# Patient Record
Sex: FEMALE | Race: WHITE | NOT HISPANIC OR LATINO | Employment: UNEMPLOYED | URBAN - METROPOLITAN AREA
[De-identification: names, ages, dates, MRNs, and addresses within clinical notes are randomized per-mention and may not be internally consistent; named-entity substitution may affect disease eponyms.]

---

## 2020-05-06 ENCOUNTER — OFFICE VISIT (OUTPATIENT)
Dept: URGENT CARE | Facility: CLINIC | Age: 30
End: 2020-05-06

## 2020-05-06 VITALS
SYSTOLIC BLOOD PRESSURE: 126 MMHG | OXYGEN SATURATION: 94 % | DIASTOLIC BLOOD PRESSURE: 74 MMHG | RESPIRATION RATE: 16 BRPM | TEMPERATURE: 98.2 F | WEIGHT: 261 LBS | HEART RATE: 120 BPM

## 2020-05-06 DIAGNOSIS — J01.01 ACUTE RECURRENT MAXILLARY SINUSITIS: ICD-10-CM

## 2020-05-06 DIAGNOSIS — R22.0 RIGHT FACIAL SWELLING: ICD-10-CM

## 2020-05-06 PROCEDURE — 99214 OFFICE O/P EST MOD 30 MIN: CPT | Performed by: PHYSICIAN ASSISTANT

## 2020-05-06 RX ORDER — LEVOFLOXACIN 750 MG/1
750 TABLET, FILM COATED ORAL DAILY
Qty: 7 TAB | Refills: 0 | Status: SHIPPED | OUTPATIENT
Start: 2020-05-06 | End: 2020-05-13

## 2020-05-06 RX ORDER — PREDNISONE 10 MG/1
40 TABLET ORAL DAILY
Qty: 20 TAB | Refills: 0 | Status: SHIPPED | OUTPATIENT
Start: 2020-05-06 | End: 2020-05-11

## 2020-05-06 ASSESSMENT — ENCOUNTER SYMPTOMS
ABDOMINAL PAIN: 0
CONSTIPATION: 0
DIARRHEA: 0
SORE THROAT: 0
EYE PAIN: 0
COUGH: 1
SPUTUM PRODUCTION: 0
FEVER: 0
SINUS PAIN: 1
MYALGIAS: 0
HEADACHES: 0
NAUSEA: 0
VOMITING: 0
CHILLS: 0
SHORTNESS OF BREATH: 0

## 2020-05-06 NOTE — LETTER
May 9, 2020        Tonya Caputo      I just wanted to follow-up on our conversation and make sure that your directions were clear:    · If you are feeling better by Monday please call ENT and schedule an appointment at their direction.    · If you are feeling the exact same on Monday and are unable to get into ENT in a timely manner, consider getting the CT sinus and I will call with results.    · If you are feeling worse and it is severe, then go to the ER, if you are feeling marginally worse consider returning to urgent care for reevaluation.    Enclosed is an order for the CT of the sinuses that you can complete at [facility].        Sincerely,            Micah Montes P.A.-C.                           Micah Montes P.A.-C.

## 2020-05-06 NOTE — PROGRESS NOTES
Subjective:   Tonya Caputo is a 29 y.o. female who presents for Sinusitis (x 3 weeks)      This 29-year-old female presents with ongoing sinusitis for the last 3 weeks.  She has this problem every spring she reports and she has a humidifier, takes allergy medicines, avoids her allergens, and symptoms all developed a sinus infection.  She was seen at a separate urgent care and was given a prescription for doxycycline (note that she is amoxicillin allergic).  She reports absolutely 0 improvement in her symptoms with a course of doxycycline.  She was also switched to Xyzal and told to continue Sudafed.  She reports that since this visit last week she has been feeling worse with more right-sided facial pressure and now she has some right-sided facial swelling.  She has no problems chewing or with her dentition although she does have dentures and reports that periodically but not recently she does have some swelling of her gums that causes discomfort with her dentures.  She is not feeling this right now.  She denies any fever or chills, myalgias, chest pain, shortness of breath but has significant discomfort.  No photophobia, or visual changes.  No pain with eye movement.      Review of Systems   Constitutional: Negative for chills, fever and malaise/fatigue.   HENT: Positive for congestion, ear pain and sinus pain. Negative for sore throat.    Eyes: Negative for pain.   Respiratory: Positive for cough. Negative for sputum production and shortness of breath.    Cardiovascular: Negative for chest pain.   Gastrointestinal: Negative for abdominal pain, constipation, diarrhea, nausea and vomiting.   Genitourinary: Negative for dysuria.   Musculoskeletal: Negative for myalgias.   Skin: Positive for itching. Negative for rash.   Neurological: Negative for headaches.       Medications:    • metFORMIN Tabs  • SUDAFED CONGESTION PO    Allergies: Amoxicillin    Problem List: Tonya Caputo does not have a problem list on  file.    Surgical History:  No past surgical history on file.    Past Social Hx: Tonya Caputo  reports that she has quit smoking. She has never used smokeless tobacco. She reports current alcohol use. She reports that she does not use drugs.     Past Family Hx:  Tonya Caputo family history is not on file.     Problem list, medications, and allergies reviewed by myself today in Epic.     Objective:     /74 (BP Location: Left arm, Patient Position: Sitting, BP Cuff Size: Adult)   Pulse (!) 120   Temp 36.8 °C (98.2 °F) (Temporal)   Resp 16   Wt 118.4 kg (261 lb)   SpO2 94%     Physical Exam  Vitals signs reviewed.   Constitutional:       Appearance: Normal appearance.   HENT:      Head:      Comments: Right-sided facial scar.  Mild right-sided tenderness to palpation and swelling over the maxillary sinus.  No pain with EOMs.  No eyelid swelling.  No conjunctival injection     Right Ear: Tympanic membrane, ear canal and external ear normal.      Left Ear: Tympanic membrane, ear canal and external ear normal.      Nose: Nose normal.      Mouth/Throat:      Mouth: Mucous membranes are moist.   Eyes:      Conjunctiva/sclera: Conjunctivae normal.   Cardiovascular:      Rate and Rhythm: Normal rate.   Pulmonary:      Effort: Pulmonary effort is normal.   Skin:     General: Skin is warm and dry.      Capillary Refill: Capillary refill takes less than 2 seconds.   Neurological:      Mental Status: She is alert and oriented to person, place, and time.           Assessment/Plan:     Diagnosis and associated orders:     1. Acute recurrent maxillary sinusitis     2. Right facial swelling        Patient Instructions:       • Tylenol for pain, consider your current antiallergy regimen.  • Complete a full course of antibiotics.  • Take the full course of steroids  • If you are not significantly improved in 5 days I want you to return to clinic for further reevaluation and management and possible change of  antibiotic to clindamycin.  • If you have any pain with movements of the eye or your face continues to swell should return sooner         Comments/MDM:     This patient appears to have some facial swelling secondary to an acute right maxillary sinusitis.  The focality of her pain is in that area, and she describes the facial swelling as being coincident and parallel with her symptoms of sinusitis.  I put in referral for ENT.  The patient has such significant inflammation I think that the powerful anti-inflammatory properties of steroids will help calm it down and since she is allergic to amoxicillin as well as doxycycline levofloxacin is a reasonable choice.  I discussed the risks including psychiatric disturbances and tendinopathy's and we made a shared decision that this is an appropriate medication and the risks do not outweigh the benefits           Differential diagnosis, natural history, supportive care, and indications for immediate follow-up discussed.    Advised the patient to follow-up with the primary care physician for recheck, reevaluation, and consideration of further management.    Please note that this dictation was created using voice recognition software. I have made reasonable attempt to correct obvious errors, but I expect that there are errors of grammar and possibly content that I did not discover before finalizing the note.    This note was electronically signed by Micah Montes PA-C

## 2020-05-07 NOTE — PATIENT INSTRUCTIONS
If not significantly improved in 5 days please return to clinic.  Return sooner if you develop worsening facial swelling.

## 2020-05-09 ENCOUNTER — TELEPHONE (OUTPATIENT)
Dept: URGENT CARE | Facility: CLINIC | Age: 30
End: 2020-05-09

## 2020-05-09 DIAGNOSIS — J32.0 MAXILLARY SINUSITIS, UNSPECIFIED CHRONICITY: ICD-10-CM

## 2020-05-09 DIAGNOSIS — K04.7 DENTAL INFECTION: ICD-10-CM

## 2020-05-09 RX ORDER — CLINDAMYCIN HYDROCHLORIDE 150 MG/1
450 CAPSULE ORAL 3 TIMES DAILY
Qty: 63 CAP | Refills: 0 | Status: SHIPPED | OUTPATIENT
Start: 2020-05-09 | End: 2020-05-09

## 2020-05-09 NOTE — TELEPHONE ENCOUNTER
I spoke with the patient on the phone today she has been taking her levofloxacin and prednisone has not really noticed any improvement.  She feels a little bit more hoarse in her voice today but she attributes this to the recent ragweed bloom.  I gave her the information for ENT and instructed her to call their office on Monday and try to get in as soon as possible.  I told her that if she is not improving she needs to return to be seen by a provider or consider going to the ER.  If she is feeling the exact same and is unable to follow-up with ENT I want her to get the CT scan.  If she is feeling better I told her that a watch and wait approach would be prudent.    Ideally the patient will be able to be seen by ENT ASAP who can decide on appropriate management.  The patient has some concerns about costs as she currently does not have insurance.  I will print out the order for the CT sinus and she can come pick it up and consider cash pay at [facility].  I told her to wait to get the CT until she knows when she will be seen by ENT.

## 2020-05-12 ENCOUNTER — TELEPHONE (OUTPATIENT)
Dept: URGENT CARE | Facility: CLINIC | Age: 30
End: 2020-05-12
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
## 2020-05-16 ENCOUNTER — TELEPHONE (OUTPATIENT)
Dept: URGENT CARE | Facility: CLINIC | Age: 30
End: 2020-05-16

## 2020-05-16 NOTE — TELEPHONE ENCOUNTER
LVM for patient, trying to recheck to ensure follow up with ENT. No answer.    Micah Montes P.A.-C.

## 2024-10-16 ENCOUNTER — OFFICE VISIT (OUTPATIENT)
Dept: URGENT CARE | Facility: PHYSICIAN GROUP | Age: 34
End: 2024-10-16
Payer: COMMERCIAL

## 2024-10-16 VITALS
RESPIRATION RATE: 16 BRPM | HEIGHT: 67 IN | DIASTOLIC BLOOD PRESSURE: 104 MMHG | SYSTOLIC BLOOD PRESSURE: 148 MMHG | TEMPERATURE: 99.3 F | WEIGHT: 293 LBS | OXYGEN SATURATION: 96 % | HEART RATE: 108 BPM | BODY MASS INDEX: 45.99 KG/M2

## 2024-10-16 DIAGNOSIS — T78.00XA ANAPHYLAXIS DUE TO FOOD: ICD-10-CM

## 2024-10-16 DIAGNOSIS — R06.02 SOB (SHORTNESS OF BREATH): ICD-10-CM

## 2024-10-16 DIAGNOSIS — T78.3XXA ANGIOEDEMA, INITIAL ENCOUNTER: ICD-10-CM

## 2024-10-16 PROCEDURE — 3077F SYST BP >= 140 MM HG: CPT | Performed by: NURSE PRACTITIONER

## 2024-10-16 PROCEDURE — 99203 OFFICE O/P NEW LOW 30 MIN: CPT | Mod: 25 | Performed by: NURSE PRACTITIONER

## 2024-10-16 PROCEDURE — 3080F DIAST BP >= 90 MM HG: CPT | Performed by: NURSE PRACTITIONER

## 2024-10-16 RX ORDER — AZELASTINE HYDROCHLORIDE 137 UG/1
SPRAY, METERED NASAL
COMMUNITY
Start: 2024-10-10

## 2024-10-16 RX ORDER — ALBUTEROL SULFATE 90 UG/1
1-2 INHALANT RESPIRATORY (INHALATION) EVERY 4 HOURS PRN
Qty: 8 G | Refills: 0 | Status: SHIPPED | OUTPATIENT
Start: 2024-10-16

## 2024-10-16 RX ORDER — DEXAMETHASONE SODIUM PHOSPHATE 10 MG/ML
10 INJECTION INTRAMUSCULAR; INTRAVENOUS ONCE
Status: COMPLETED | OUTPATIENT
Start: 2024-10-16 | End: 2024-10-16

## 2024-10-16 RX ORDER — IPRATROPIUM BROMIDE 21 UG/1
SPRAY, METERED NASAL
COMMUNITY
Start: 2024-10-10

## 2024-10-16 RX ORDER — METHYLPREDNISOLONE 4 MG/1
TABLET ORAL
Qty: 1 EACH | Refills: 0 | Status: SHIPPED | OUTPATIENT
Start: 2024-10-16

## 2024-10-16 RX ADMIN — DEXAMETHASONE SODIUM PHOSPHATE 10 MG: 10 INJECTION INTRAMUSCULAR; INTRAVENOUS at 10:40

## 2024-10-16 ASSESSMENT — ENCOUNTER SYMPTOMS
CONSTITUTIONAL NEGATIVE: 1
SHORTNESS OF BREATH: 1
SORE THROAT: 0
FEVER: 0
COUGH: 0
BACK PAIN: 1
NEUROLOGICAL NEGATIVE: 1
ROS SKIN COMMENTS: FACIAL REDNESS

## 2024-10-16 ASSESSMENT — VISUAL ACUITY: OU: 1

## 2025-02-03 ENCOUNTER — OFFICE VISIT (OUTPATIENT)
Dept: URGENT CARE | Facility: PHYSICIAN GROUP | Age: 35
End: 2025-02-03
Payer: COMMERCIAL

## 2025-02-03 VITALS
BODY MASS INDEX: 45.99 KG/M2 | WEIGHT: 293 LBS | OXYGEN SATURATION: 96 % | HEART RATE: 102 BPM | SYSTOLIC BLOOD PRESSURE: 134 MMHG | TEMPERATURE: 97.3 F | RESPIRATION RATE: 16 BRPM | DIASTOLIC BLOOD PRESSURE: 88 MMHG | HEIGHT: 67 IN

## 2025-02-03 DIAGNOSIS — R11.2 NAUSEA VOMITING AND DIARRHEA: ICD-10-CM

## 2025-02-03 DIAGNOSIS — R19.7 NAUSEA VOMITING AND DIARRHEA: ICD-10-CM

## 2025-02-03 PROCEDURE — 3079F DIAST BP 80-89 MM HG: CPT | Performed by: PHYSICIAN ASSISTANT

## 2025-02-03 PROCEDURE — 99213 OFFICE O/P EST LOW 20 MIN: CPT | Performed by: PHYSICIAN ASSISTANT

## 2025-02-03 PROCEDURE — 3075F SYST BP GE 130 - 139MM HG: CPT | Performed by: PHYSICIAN ASSISTANT

## 2025-02-03 RX ORDER — ONDANSETRON 4 MG/1
4 TABLET, ORALLY DISINTEGRATING ORAL EVERY 6 HOURS PRN
Qty: 20 TABLET | Refills: 0 | Status: SHIPPED | OUTPATIENT
Start: 2025-02-03

## 2025-02-03 RX ORDER — ONDANSETRON 2 MG/ML
4 INJECTION INTRAMUSCULAR; INTRAVENOUS ONCE
Status: DISCONTINUED | OUTPATIENT
Start: 2025-02-03 | End: 2025-02-03

## 2025-02-03 RX ORDER — FLUTICASONE PROPIONATE 93 UG/1
SPRAY, METERED NASAL
COMMUNITY
Start: 2024-05-01

## 2025-02-03 RX ORDER — ONDANSETRON 2 MG/ML
4 INJECTION INTRAMUSCULAR; INTRAVENOUS ONCE
Status: COMPLETED | OUTPATIENT
Start: 2025-02-03 | End: 2025-02-03

## 2025-02-03 RX ORDER — ACETAMINOPHEN AND CODEINE PHOSPHATE 120; 12 MG/5ML; MG/5ML
SOLUTION ORAL
COMMUNITY
Start: 2024-12-01

## 2025-02-03 RX ADMIN — ONDANSETRON 4 MG: 2 INJECTION INTRAMUSCULAR; INTRAVENOUS at 10:38

## 2025-02-03 ASSESSMENT — ENCOUNTER SYMPTOMS
VOMITING: 1
PALPITATIONS: 0
CONSTIPATION: 0
SHORTNESS OF BREATH: 0
DIARRHEA: 1
DIAPHORESIS: 0
ABDOMINAL PAIN: 0
WEAKNESS: 0
COUGH: 0
BLOOD IN STOOL: 0
DIZZINESS: 0
CHILLS: 0
HEARTBURN: 0
FLANK PAIN: 0
MYALGIAS: 1
FEVER: 0
SORE THROAT: 0
HEADACHES: 0
NAUSEA: 1

## 2025-02-03 NOTE — PROGRESS NOTES
Subjective:     CHIEF COMPLAINT  Chief Complaint   Patient presents with    Nausea/Vomiting/Diarrhea     X 3 days with leg cramping.  Denies fever or chills. Had an allergic to some food with Paprika also on Saturday.        HPI  Tonya Caputo is a very pleasant 34 y.o. female who presents to the clinic with nausea, vomiting and diarrhea x 3 days.  Symptoms started late Saturday night.  States she had multiple bouts of emesis yesterday.  Woke up this morning and had 1 bout of emesis however has continued to feel nauseous throughout the day.  Has been able to tolerate small amounts of intake.  Has had a few episodes of loose stool.  No blood or mucus in the stool.  Describes generalized abdominal soreness.  No sharp abdominal pain.  Denies any urinary complaints.  No fevers or chills.  Has had some mild bodyaches.  Believes symptoms may have started after eating Paprika which is a food trigger for her.  No recent travel or antibiotic use.  No known ill contacts.  No new medications.  Has tried Pepto-Bismol with minimal relief.    REVIEW OF SYSTEMS  Review of Systems   Constitutional:  Negative for chills, diaphoresis, fever and malaise/fatigue.   HENT:  Negative for congestion and sore throat.    Respiratory:  Negative for cough and shortness of breath.    Cardiovascular:  Negative for chest pain and palpitations.   Gastrointestinal:  Positive for diarrhea, nausea and vomiting. Negative for abdominal pain, blood in stool, constipation, heartburn and melena.   Genitourinary:  Negative for dysuria, flank pain, frequency, hematuria and urgency.   Musculoskeletal:  Positive for myalgias.   Skin:  Negative for rash.   Neurological:  Negative for dizziness, weakness and headaches.       PAST MEDICAL HISTORY  There are no active problems to display for this patient.      SURGICAL HISTORY  patient denies any surgical history    ALLERGIES  Allergies   Allergen Reactions    Amoxicillin      Not allergic per  "Allergist says pt.     Levofloxacin      Severe tremors    Prednisone      Fu face       CURRENT MEDICATIONS  Home Medications       Reviewed by Barrington Padilla P.A.-C. (Physician Assistant) on 02/03/25 at 1025  Med List Status: <None>     Medication Last Dose Status   albuterol 108 (90 Base) MCG/ACT Aero Soln inhalation aerosol PRN Active   Azelastine (ASTELIN) 137 MCG/SPRAY Solution PRN Active   ipratropium (ATROVENT) 0.03 % Solution PRN Active   metFORMIN (GLUCOPHAGE) 500 MG Tab Taking Active   methylPREDNISolone (MEDROL DOSEPAK) 4 MG Tablet Therapy Pack  Active   norethindrone (MICRONOR) 0.35 MG tablet Taking Active   Pseudoephedrine HCl (SUDAFED CONGESTION PO) PRN Active   XHANCE 93 MCG/ACT Exhaler Suspension Taking Active                    SOCIAL HISTORY  Social History     Tobacco Use    Smoking status: Former    Smokeless tobacco: Never   Substance and Sexual Activity    Alcohol use: Yes     Comment: occ    Drug use: Never    Sexual activity: Not on file       FAMILY HISTORY  History reviewed. No pertinent family history.       Objective:     VITAL SIGNS: /88 (BP Location: Right arm, Patient Position: Sitting, BP Cuff Size: Adult long)   Pulse (!) 102   Temp 36.3 °C (97.3 °F) (Temporal)   Resp 16   Ht 1.702 m (5' 7\")   Wt (!) 142 kg (314 lb)   SpO2 96%   BMI 49.18 kg/m²     PHYSICAL EXAM  Physical Exam  Constitutional:       General: She is not in acute distress.     Appearance: Normal appearance. She is not ill-appearing, toxic-appearing or diaphoretic.   HENT:      Head: Normocephalic and atraumatic.      Mouth/Throat:      Mouth: Mucous membranes are moist.      Pharynx: No oropharyngeal exudate or posterior oropharyngeal erythema.   Eyes:      Conjunctiva/sclera: Conjunctivae normal.   Cardiovascular:      Rate and Rhythm: Regular rhythm. Tachycardia present.      Pulses: Normal pulses.      Heart sounds: Normal heart sounds.   Pulmonary:      Effort: Pulmonary effort is normal.      " Breath sounds: Normal breath sounds. No wheezing.   Abdominal:      General: Bowel sounds are normal. There is no distension.      Palpations: Abdomen is soft. There is no mass.      Tenderness: There is no abdominal tenderness. There is no right CVA tenderness, left CVA tenderness, guarding or rebound. Negative signs include House's sign, Rovsing's sign, McBurney's sign, psoas sign and obturator sign.      Hernia: No hernia is present.   Musculoskeletal:         General: Normal range of motion.      Cervical back: Normal range of motion. No muscular tenderness.   Lymphadenopathy:      Cervical: No cervical adenopathy.   Skin:     General: Skin is warm and dry.      Capillary Refill: Capillary refill takes less than 2 seconds.   Neurological:      General: No focal deficit present.      Mental Status: She is alert and oriented to person, place, and time. Mental status is at baseline.   Psychiatric:         Mood and Affect: Mood normal.         Thought Content: Thought content normal.         Assessment/Plan:     1. Nausea vomiting and diarrhea  ondansetron (ZOFRAN ODT) 4 MG TABLET DISPERSIBLE    ondansetron (Zofran) syringe/vial injection 4 mg    DISCONTINUED: ondansetron (Zofran) syringe/vial injection 4 mg          MDM/Comments:    Very pleasant and well-appearing 34-year-old female presented to the clinic with nausea, vomiting and diarrhea x 2 days 3 days.  Symptoms seem slightly improved today as compared to yesterday.  Continues to feel nauseous.  On exam normal bowel sounds present in all quadrants.  No abdominal tenderness, rebound, rigidity or guarding.  Low suspicion for any acute abdomen at this time.  Will treat supportively with Zofran, bland diet and increase fluid and electrolyte intake.  Return/ED precautions discussed for worsening symptoms.    Differential diagnosis, natural history, supportive care, and indications for immediate follow-up discussed. All questions answered. Patient agrees with the  plan of care.    Follow-up as needed if symptoms worsen or fail to improve to PCP, Urgent care or Emergency Room.    I have personally reviewed prior external notes and test results pertinent to today's visit.  I have independently reviewed and interpreted all diagnostics ordered during this urgent care acute visit.   Discussed management options (risks,benefits, and alternatives to treatment). Pt expresses understanding and the treatment plan was agreed upon. Questions were encouraged and answered to pt's satisfaction.    Please note that this dictation was created using voice recognition software. I have made a reasonable attempt to correct obvious errors, but I expect that there are errors of grammar and possibly content that I did not discover before finalizing the note.

## 2025-08-15 DIAGNOSIS — Z00.6 CLINICAL TRIAL PARTICIPANT: ICD-10-CM
